# Patient Record
Sex: FEMALE | Race: WHITE | Employment: UNEMPLOYED | URBAN - NONMETROPOLITAN AREA
[De-identification: names, ages, dates, MRNs, and addresses within clinical notes are randomized per-mention and may not be internally consistent; named-entity substitution may affect disease eponyms.]

---
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
## 2025-01-22 ENCOUNTER — HOSPITAL ENCOUNTER (EMERGENCY)
Age: 20
Discharge: HOME | End: 2025-01-22
Payer: COMMERCIAL

## 2025-01-22 VITALS
HEART RATE: 103 BPM | OXYGEN SATURATION: 100 % | SYSTOLIC BLOOD PRESSURE: 122 MMHG | DIASTOLIC BLOOD PRESSURE: 66 MMHG | WEIGHT: 108 LBS | BODY MASS INDEX: 16.92 KG/M2 | TEMPERATURE: 98.2 F | RESPIRATION RATE: 18 BRPM

## 2025-01-22 DIAGNOSIS — Z32.01 POSITIVE URINE PREGNANCY TEST: Primary | ICD-10-CM

## 2025-01-22 DIAGNOSIS — Z34.01 PREGNANCY, FIRST, FIRST TRIMESTER: ICD-10-CM

## 2025-01-22 LAB — HCG UR QL: POSITIVE

## 2025-01-22 PROCEDURE — 99213 OFFICE O/P EST LOW 20 MIN: CPT

## 2025-01-22 PROCEDURE — 81025 URINE PREGNANCY TEST: CPT

## 2025-01-22 RX ORDER — PNV NO.95/FERROUS FUM/FOLIC AC 28MG-0.8MG
1 TABLET ORAL DAILY
Qty: 90 TABLET | Refills: 0 | Status: SHIPPED | OUTPATIENT
Start: 2025-01-22 | End: 2025-04-22

## 2025-01-22 ASSESSMENT — PAIN SCALES - GENERAL: PAINLEVEL_OUTOF10: 3

## 2025-01-22 ASSESSMENT — ENCOUNTER SYMPTOMS
ALLERGIC/IMMUNOLOGIC NEGATIVE: 1
TROUBLE SWALLOWING: 0
DIARRHEA: 0
SORE THROAT: 0
BACK PAIN: 0
CONSTIPATION: 0
EYE DISCHARGE: 0
ABDOMINAL PAIN: 0
NAUSEA: 0
EYE PAIN: 0
RHINORRHEA: 0
COUGH: 0
WHEEZING: 0
SHORTNESS OF BREATH: 0
VOMITING: 0
EYE REDNESS: 0

## 2025-01-22 ASSESSMENT — PAIN - FUNCTIONAL ASSESSMENT
PAIN_FUNCTIONAL_ASSESSMENT: 0-10
PAIN_FUNCTIONAL_ASSESSMENT: ACTIVITIES ARE NOT PREVENTED

## 2025-01-22 ASSESSMENT — PAIN DESCRIPTION - DESCRIPTORS: DESCRIPTORS: ACHING

## 2025-01-22 ASSESSMENT — PAIN DESCRIPTION - LOCATION: LOCATION: ABDOMEN

## 2025-01-22 ASSESSMENT — PAIN DESCRIPTION - PAIN TYPE: TYPE: ACUTE PAIN

## 2025-01-22 ASSESSMENT — PAIN DESCRIPTION - FREQUENCY: FREQUENCY: CONTINUOUS

## 2025-01-22 NOTE — ED PROVIDER NOTES
[facility] URGENT CARE  Urgent Care Encounter      CHIEF COMPLAINT       Chief Complaint   Patient presents with    Pregnancy Test     \"I took 2 home tests and they were both positive so I just wanted to be sure\"       Nurses Notes reviewed and I agree except as noted in the HPI.  HISTORY OF PRESENT ILLNESS   Yen Jewell is a 19 y.o. female who presents with concern about pregnancy as her normal menstrual cycle was 13 days late, and she is having pelvic cramping.  Patient is never been pregnant before.    REVIEW OF SYSTEMS     Review of Systems   Constitutional:  Negative for activity change, fatigue and fever.   HENT:  Negative for congestion, ear pain, rhinorrhea, sore throat and trouble swallowing.    Eyes:  Negative for pain, discharge and redness.   Respiratory:  Negative for cough, shortness of breath and wheezing.    Cardiovascular: Negative.    Gastrointestinal:  Negative for abdominal pain, constipation, diarrhea, nausea and vomiting.        Pelvic cramping   Endocrine: Negative.    Genitourinary:  Negative for dysuria, frequency and urgency.   Musculoskeletal:  Negative for arthralgias, back pain and myalgias.   Skin:  Negative for rash.   Allergic/Immunologic: Negative.    Neurological:  Negative for dizziness, tremors, weakness and headaches.   Hematological: Negative.    Psychiatric/Behavioral:  Negative for dysphoric mood and sleep disturbance. The patient is not nervous/anxious.        PAST MEDICAL HISTORY   History reviewed. No pertinent past medical history.    SURGICAL HISTORY     Patient  has no past surgical history on file.    CURRENT MEDICATIONS       Previous Medications    LEVOTHYROXINE (SYNTHROID) 50 MCG TABLET    Take 1 tablet by mouth Daily       ALLERGIES     Patient is is allergic to amoxil [amoxicillin].    FAMILY HISTORY     Patient'sfamily history is not on file.    SOCIAL HISTORY     Patient  reports that she has quit smoking. Her smoking use included cigarettes. She has never

## 2025-04-30 ENCOUNTER — ROUTINE PRENATAL (OUTPATIENT)
Age: 20
End: 2025-04-30
Payer: MEDICAID

## 2025-04-30 VITALS
DIASTOLIC BLOOD PRESSURE: 62 MMHG | TEMPERATURE: 99.1 F | BODY MASS INDEX: 18.52 KG/M2 | WEIGHT: 118 LBS | HEIGHT: 67 IN | SYSTOLIC BLOOD PRESSURE: 117 MMHG | RESPIRATION RATE: 16 BRPM | HEART RATE: 102 BPM

## 2025-04-30 DIAGNOSIS — Z3A.20 20 WEEKS GESTATION OF PREGNANCY: ICD-10-CM

## 2025-04-30 DIAGNOSIS — E06.3 AUTOIMMUNE THYROIDITIS: ICD-10-CM

## 2025-04-30 DIAGNOSIS — Z34.02 FIRST PREGNANCY, SECOND TRIMESTER: Primary | ICD-10-CM

## 2025-04-30 PROCEDURE — 99202 OFFICE O/P NEW SF 15 MIN: CPT | Performed by: OBSTETRICS & GYNECOLOGY

## 2025-04-30 PROCEDURE — 76811 OB US DETAILED SNGL FETUS: CPT | Performed by: OBSTETRICS & GYNECOLOGY

## 2025-04-30 PROCEDURE — 76817 TRANSVAGINAL US OBSTETRIC: CPT | Performed by: OBSTETRICS & GYNECOLOGY

## 2025-04-30 RX ORDER — LEVOTHYROXINE SODIUM 25 UG/1
25 TABLET ORAL DAILY
Qty: 90 TABLET | Refills: 1 | Status: SHIPPED | OUTPATIENT
Start: 2025-04-30

## 2025-04-30 RX ORDER — QUETIAPINE FUMARATE 400 MG/1
TABLET, FILM COATED ORAL
COMMUNITY
Start: 2025-03-06

## 2025-04-30 NOTE — PROGRESS NOTES
PT did not provide urine at time of vitals  
Please refer to attached ultrasound report for doctor's evaluation of the clinical information obtained by vital signs, ultrasound, and/or non-stress test along with management recommendation.    
intercourse no breast emulation no orgasm.  2.  Return in 6 weeks for growth and placental evaluation.  3.  If you could order a TSH and free T4 for her in 4 weeks that would be greatly appreciated.  4.  Follow-up would be otherwise as clinically indicated.    The patient is to continue to follow with you in your office for ongoing obstetric care.     PLAN:    As noted above or sooner prn.    Sincerely,        Avinash Spivey MD    I spent 30 minutes of direct contact time with the patient of which greater than 50% of the time was used to  the patient, discuss complications and problems related to her pregnancy, or coordinating her care in addition to the time spent interpreting the ultrasound. I answered all of her questions to her satisfaction.

## 2025-06-08 ENCOUNTER — HOSPITAL ENCOUNTER (OUTPATIENT)
Age: 20
Discharge: HOME | End: 2025-06-08
Attending: OBSTETRICS & GYNECOLOGY
Payer: MEDICAID

## 2025-06-08 VITALS — TEMPERATURE: 97.3 F | DIASTOLIC BLOOD PRESSURE: 72 MMHG | HEART RATE: 97 BPM | SYSTOLIC BLOOD PRESSURE: 129 MMHG

## 2025-06-08 DIAGNOSIS — R51.9 HEADACHE IN PREGNANCY, ANTEPARTUM, SECOND TRIMESTER: Primary | ICD-10-CM

## 2025-06-08 DIAGNOSIS — O26.892 HEADACHE IN PREGNANCY, ANTEPARTUM, SECOND TRIMESTER: Primary | ICD-10-CM

## 2025-06-08 PROCEDURE — 6370000000 HC RX 637 (ALT 250 FOR IP): Performed by: OBSTETRICS & GYNECOLOGY

## 2025-06-08 PROCEDURE — 99283 EMERGENCY DEPT VISIT LOW MDM: CPT

## 2025-06-08 RX ORDER — ONDANSETRON 4 MG/1
4 TABLET, ORALLY DISINTEGRATING ORAL ONCE
Status: COMPLETED | OUTPATIENT
Start: 2025-06-08 | End: 2025-06-08

## 2025-06-08 RX ORDER — ACETAMINOPHEN 325 MG/1
650 TABLET ORAL ONCE
Status: COMPLETED | OUTPATIENT
Start: 2025-06-08 | End: 2025-06-08

## 2025-06-08 RX ORDER — IBUPROFEN 800 MG/1
800 TABLET, FILM COATED ORAL
Status: COMPLETED | OUTPATIENT
Start: 2025-06-08 | End: 2025-06-08

## 2025-06-08 RX ORDER — FAMOTIDINE 20 MG/1
20 TABLET, FILM COATED ORAL 2 TIMES DAILY
Status: DISCONTINUED | OUTPATIENT
Start: 2025-06-08 | End: 2025-06-09 | Stop reason: HOSPADM

## 2025-06-08 RX ADMIN — ONDANSETRON 4 MG: 4 TABLET, ORALLY DISINTEGRATING ORAL at 22:56

## 2025-06-08 RX ADMIN — IBUPROFEN 800 MG: 800 TABLET, FILM COATED ORAL at 23:15

## 2025-06-08 RX ADMIN — FAMOTIDINE 20 MG: 20 TABLET, FILM COATED ORAL at 22:53

## 2025-06-08 RX ADMIN — ACETAMINOPHEN 650 MG: 325 TABLET ORAL at 22:53

## 2025-06-09 NOTE — ED TRIAGE NOTES
Pt states she does have suicidal ideation at times, denies having an active plan for suicide or self harm currently.

## 2025-06-09 NOTE — ED TRIAGE NOTES
Pt now states her OB is actually in [place] behind [facility] and that she has not kept her last couple of appts there. She has an upcoming appt with a doctor in [place] but doesn't know their name either.

## 2025-06-09 NOTE — ED TRIAGE NOTES
Discharge instructions given and reviewed with patient.  Informed patient to notify physican or come back to L & D if leaking fluid from vagina with or without contractions.  Bright red vaginal bleeding occurs that is as heavy as or heavier than a period.  Regular contractions that are 2-5 minutes apart that are longer, stronger, and closer together.  Decreased fetal movement.  Elevated temperature >100.5°F and chills.  Blurred vision, spots before eyes, unrelievable headache, severe facial swelling, or upper abdominal pain.  Questions denied. Advised pt to keep her next scheduled appt with primary OBGYN, she is agreeable.

## 2025-06-09 NOTE — ED TRIAGE NOTES
Dr. Love aware pt is here, she is attending a delivery currently and will be in to see pt shortly.

## 2025-06-09 NOTE — ED TRIAGE NOTES
G1 25w4d patient (thinks she sees Dr. Kovacs but says could not remember her name) presents to L&D for c/o headache for 2 days as well as some nausea, reflux, and achy legs. Pt notes positive fetal movement. Denies vaginal bleeding or leaking of fluids. Denies regular contractions. Denies any fever.    Pt states has not tried any Tylenol for headache. Pt's boyfriend says his dad was recently sick with a viral illness that gave him a headache and cough for about a week.

## 2025-06-09 NOTE — DISCHARGE INSTRUCTIONS
Home Undelivered Discharge Instructions    After Discharge Orders:           Diet: regular diet   Increase fluid intake to 8-10 glasses of water daily    Rest: normal activity as tolerated    Other instructions: Do kick counts once a day on your baby. Choose the time of day your baby is most active. Get in a comfortable lying or sitting position and time how long it takes to feel 10 kicks, twists, turns, swishes, or rolls.     Call Your Doctor if Any of the Following Occurs   Leaking fluid from vagina with or without contractions  Bright red vaginal bleeding occurs that is as heavy as or heavier than a period  Regular contractions are longer, stronger, and closer together  Noticeable decreased fetal movement  Elevated temperature >100.5°F and chills  Blurred vision, spots before eyes, unrelievable headache, severe facial swelling, or upper abdominal pain  Contact physician or hospital OB unit if signs of premature labor occur at <=36 weeks  Persistent or rhythmic low back pain that feels different than you are used to  Menstrual like cramps  Intestinal cramps with or without diarrhea  Pelvic pressure or rhythmic tightening that feels different than you are used to  Watery discharge or a gush of fluid from your vagina  Vaginal bleeding as heavy as a period  General Information     Difference between:  False Labor True Labor   1. Contractions often are irregular and don't consistently get closer together (called [place]-Villanueva contractions) 1. Contractions come at regular intervals and, as time goes on, get closer together.   2. Contractions may often stop when you rest or with a position change. 2. Contractions continue despite movement or walking. Contractions get stronger and closer together with time.   3. Often felt in the lower abdomen. 3. Usually felt in back coming around to the front.     Reminder to Patient   Please bring all teaching sheets and discharge information with you if you return to the hospital or

## 2025-06-09 NOTE — ED PROVIDER NOTES
Department of Obstetrics and Gynecology  Labor and Delivery  OB EMERGENCY ROOM    [facility] Attending      Pt Name: Yen Jewell  MRN: [id number] Acct #: [id number]  YOB: 2005  Procedure Performed By: Page Love MD       CC: headache x 2d, nausea, heartburn      SUBJECTIVE  Patient is a  20 year old  at 25 weeks,  patient of a Dr in [facility] (unsure of name), presents with complaints of  headache x 2d, nausea, heartburn.  She says her headache is frontal, bitemporal, no vision changes or n/v.  She has not taken tylenol.  She is drinking lots of water.    Patient denies any decreased fetal movement, vaginal bleeding, leaking of fluid  Denies any headaches, visual symptoms or epigastric pain.   Denies fever, chills, coughing, wheezing, chest pain, shortness of breath, nausea, vomiting, diarrhea, constipation, or dysuria.    OB History    OB History    Para Term  AB Living   1        SAB IAB Ectopic Molar Multiple Live Births              # Outcome Date GA Lbr Geo/2nd Weight Sex Type Anes PTL Lv   1 Current                PMH: History reviewed. No pertinent past medical history.    PSH:   Past Surgical History:   Procedure Laterality Date    NOSE SURGERY      age 7       ROS - negative except those noted in HPI    Allergies: Amoxil [amoxicillin]    Family History: History reviewed. No pertinent family history.    Social History:   Social History     Tobacco Use    Smoking status: Former     Types: Cigarettes    Smokeless tobacco: Never   Vaping Use    Vaping status: Former    Substances: Nicotine    Devices: Disposable, Pre-filled pod   Substance Use Topics    Alcohol use: Not Currently     Comment: rarely    Drug use: Not Currently     Frequency: 7.0 times per week     Types: Marijuana (Weed)     Comment: daily       OBJECTIVE:  Vitals: Patient Vitals for the past 24 hrs:   BP Temp Pulse   25 2227 129/72 97.3 °F (36.3 °C) 97     General appearance: No apparent distress,

## 2025-06-11 ENCOUNTER — ROUTINE PRENATAL (OUTPATIENT)
Age: 20
End: 2025-06-11
Payer: MEDICAID

## 2025-06-11 VITALS
WEIGHT: 125.66 LBS | SYSTOLIC BLOOD PRESSURE: 109 MMHG | RESPIRATION RATE: 16 BRPM | DIASTOLIC BLOOD PRESSURE: 69 MMHG | HEART RATE: 108 BPM | HEIGHT: 67 IN | BODY MASS INDEX: 19.72 KG/M2 | TEMPERATURE: 100.8 F

## 2025-06-11 DIAGNOSIS — Z34.02 FIRST PREGNANCY, SECOND TRIMESTER: ICD-10-CM

## 2025-06-11 DIAGNOSIS — Z3A.26 26 WEEKS GESTATION OF PREGNANCY: ICD-10-CM

## 2025-06-11 DIAGNOSIS — O36.5920 POOR FETAL GROWTH AFFECTING MANAGEMENT OF MOTHER IN SECOND TRIMESTER, SINGLE OR UNSPECIFIED FETUS: Primary | ICD-10-CM

## 2025-06-11 PROCEDURE — 76816 OB US FOLLOW-UP PER FETUS: CPT | Performed by: OBSTETRICS & GYNECOLOGY

## 2025-06-11 PROCEDURE — 76817 TRANSVAGINAL US OBSTETRIC: CPT | Performed by: OBSTETRICS & GYNECOLOGY

## 2025-06-11 NOTE — PROGRESS NOTES
[facility] MATERNAL FETAL MED  [address]  [place] [address]  Dept: [phone]     2025    RE:  Yen Jewell     : 2005   AGE: 20 y.o.     Dear Dr. Palma,    Thank you for allowing me to see Yen Jewell.  As I'm sure you will recall, Yen Jewell is a 20 y.o. [id number]'s last menstrual period was 2024 (exact date). Estimated Date of Delivery: 25 at 26w0d seen in our office today for the following:    REASON FOR VISIT: Growth and placental location    Patient Active Problem List    Diagnosis Date Noted    Poor fetal growth affecting management of mother in second trimester 2025    Headache in pregnancy, antepartum, second trimester 2025    First pregnancy, second trimester 2025    26 weeks gestation of pregnancy 2025    Autoimmune thyroiditis 2025        PAST HISTORY:  OB History    Para Term  AB Living   1        SAB IAB Ectopic Molar Multiple Live Births              # Outcome Date GA Lbr Geo/2nd Weight Sex Type Anes PTL Lv   1 Current                   MEDICAL:  History reviewed. No pertinent past medical history.     SURGICAL:  Past Surgical History:   Procedure Laterality Date    NOSE SURGERY      age 7       ALLERGIES:    Allergies   Allergen Reactions    Amoxil [Amoxicillin] Hives         MEDICATIONS:    Current Outpatient Medications   Medication Sig Dispense Refill    QUEtiapine (SEROQUEL) 400 MG tablet TAKE 1 TABLET BY MOUTH AT BEDTIME FOR 90 DAYS      levothyroxine (SYNTHROID) 25 MCG tablet Take 1 tablet by mouth daily 90 tablet 1    Prenatal Vit-Fe Fumarate-FA (PRENATAL VITAMINS) 28-0.8 MG TABS Take 1 tablet by mouth daily 90 tablet 0     No current facility-administered medications for this visit.        Social History     Socioeconomic History    Marital status: Single     Spouse name: None    Number of children: None    Years of education: None    Highest

## 2025-06-18 ENCOUNTER — HOSPITAL ENCOUNTER (EMERGENCY)
Age: 20
Discharge: HOME | End: 2025-06-18
Payer: MEDICAID

## 2025-06-18 VITALS
HEART RATE: 119 BPM | DIASTOLIC BLOOD PRESSURE: 71 MMHG | RESPIRATION RATE: 16 BRPM | WEIGHT: 125 LBS | SYSTOLIC BLOOD PRESSURE: 127 MMHG | OXYGEN SATURATION: 99 % | BODY MASS INDEX: 19.62 KG/M2 | HEIGHT: 67 IN | TEMPERATURE: 97.8 F

## 2025-06-18 DIAGNOSIS — K64.9 HEMORRHOIDS, UNSPECIFIED HEMORRHOID TYPE: Primary | ICD-10-CM

## 2025-06-18 PROCEDURE — 99213 OFFICE O/P EST LOW 20 MIN: CPT

## 2025-06-18 PROCEDURE — 99213 OFFICE O/P EST LOW 20 MIN: CPT | Performed by: NURSE PRACTITIONER

## 2025-06-18 RX ORDER — LIDOCAINE 50 MG/G
OINTMENT TOPICAL
Qty: 35 G | Refills: 0 | Status: SHIPPED | OUTPATIENT
Start: 2025-06-18

## 2025-06-18 RX ORDER — BENZOCAINE/MENTHOL 6 MG-10 MG
LOZENGE MUCOUS MEMBRANE
Qty: 28 G | Refills: 0 | Status: SHIPPED | OUTPATIENT
Start: 2025-06-18

## 2025-06-18 ASSESSMENT — ENCOUNTER SYMPTOMS
NAUSEA: 0
DIARRHEA: 0
SORE THROAT: 0
SHORTNESS OF BREATH: 0
VOMITING: 0
RECTAL PAIN: 1
ABDOMINAL PAIN: 0
COUGH: 0
CONSTIPATION: 0

## 2025-06-18 ASSESSMENT — PAIN DESCRIPTION - ONSET: ONSET: GRADUAL

## 2025-06-18 ASSESSMENT — PAIN SCALES - GENERAL: PAINLEVEL_OUTOF10: 6

## 2025-06-18 ASSESSMENT — PAIN DESCRIPTION - PAIN TYPE: TYPE: ACUTE PAIN

## 2025-06-18 ASSESSMENT — PAIN DESCRIPTION - FREQUENCY: FREQUENCY: CONTINUOUS

## 2025-06-18 ASSESSMENT — PAIN - FUNCTIONAL ASSESSMENT
PAIN_FUNCTIONAL_ASSESSMENT: PREVENTS OR INTERFERES SOME ACTIVE ACTIVITIES AND ADLS
PAIN_FUNCTIONAL_ASSESSMENT: 0-10

## 2025-06-18 ASSESSMENT — PAIN DESCRIPTION - LOCATION: LOCATION: RECTUM

## 2025-06-18 ASSESSMENT — PAIN DESCRIPTION - DESCRIPTORS: DESCRIPTORS: SORE

## 2025-06-18 NOTE — ED NOTES
Pt co noticing a painful lump near her rectum this am that has gotten more painful throughout the day.      Jah Hayward RN  06/18/25 3931

## 2025-06-18 NOTE — ED PROVIDER NOTES
[facility] URGENT CARE  UrgentCare Encounter      CHIEFCOMPLAINT       Chief Complaint   Patient presents with    Rectal Pain     Lump on rectal area       Nurses Notes reviewed and I agree except as noted in the HPI.  HISTORY OF PRESENT ILLNESS   Yen Jewell is a 20 y.o. female who presents to urgent care for complaints of rectal pain and swelling.  States started a couple of days ago, and has gotten worse.  Voices that she is 27 weeks pregnant.  Denies any constipation or hard BMs.  Also denies fever, chills, body aches, nausea, vomiting, or diarrhea.    REVIEW OF SYSTEMS     Review of Systems   Constitutional:  Negative for chills, fatigue and fever.   HENT:  Negative for congestion and sore throat.    Respiratory:  Negative for cough and shortness of breath.    Cardiovascular:  Negative for chest pain.   Gastrointestinal:  Positive for rectal pain. Negative for abdominal pain, constipation, diarrhea, nausea and vomiting.   Genitourinary:  Negative for dysuria.   Musculoskeletal:  Negative for myalgias.   Skin:  Negative for rash.         PAST MEDICAL HISTORY   History reviewed. No pertinent past medical history.    SURGICAL HISTORY     Patient  has a past surgical history that includes Nose surgery.    CURRENT MEDICATIONS       Discharge Medication List as of 6/18/2025  6:49 PM        CONTINUE these medications which have NOT CHANGED    Details   QUEtiapine (SEROQUEL) 400 MG tablet TAKE 1 TABLET BY MOUTH AT BEDTIME FOR 90 DAYSHistorical Med      levothyroxine (SYNTHROID) 25 MCG tablet Take 1 tablet by mouth daily, Disp-90 tablet, R-1Normal      Prenatal Vit-Fe Fumarate-FA (PRENATAL VITAMINS) 28-0.8 MG TABS Take 1 tablet by mouth daily, Disp-90 tablet, R-0May substitute any other appropriate prenatal vitamin available.Normal             ALLERGIES     Patient is is allergic to amoxil [amoxicillin].    FAMILY HISTORY     Patient'sfamily history is not on file.    SOCIAL HISTORY     Patient  reports that she has

## 2025-06-18 NOTE — ED NOTES
PT GIVEN DISCHARGE INSTRUCTIONS, VERBALIZES UNDERSTANDING.  PT ASSESSMENT UNCHANGED, DISCHARGED IN STABLE CONDITION.        Jah Hayward RN  06/18/25 3506

## 2025-06-18 NOTE — DISCHARGE INSTRUCTIONS
Call OB/GYN for [place] tomorrow morning.    Tylenol for pain.    Try to avoid prolonged sitting on toilet.    Take MiraLAX to keep stools soft.

## 2025-07-02 ENCOUNTER — ROUTINE PRENATAL (OUTPATIENT)
Age: 20
End: 2025-07-02
Payer: MEDICAID

## 2025-07-02 VITALS
WEIGHT: 136.02 LBS | SYSTOLIC BLOOD PRESSURE: 132 MMHG | HEIGHT: 67 IN | BODY MASS INDEX: 21.35 KG/M2 | HEART RATE: 112 BPM | RESPIRATION RATE: 16 BRPM | DIASTOLIC BLOOD PRESSURE: 70 MMHG | TEMPERATURE: 99.9 F

## 2025-07-02 DIAGNOSIS — O26.892 HEADACHE IN PREGNANCY, ANTEPARTUM, SECOND TRIMESTER: ICD-10-CM

## 2025-07-02 DIAGNOSIS — Z34.02 FIRST PREGNANCY, SECOND TRIMESTER: ICD-10-CM

## 2025-07-02 DIAGNOSIS — R51.9 HEADACHE IN PREGNANCY, ANTEPARTUM, SECOND TRIMESTER: ICD-10-CM

## 2025-07-02 DIAGNOSIS — O36.5920 POOR FETAL GROWTH AFFECTING MANAGEMENT OF MOTHER IN SECOND TRIMESTER, SINGLE OR UNSPECIFIED FETUS: Primary | ICD-10-CM

## 2025-07-02 DIAGNOSIS — Z3A.29 29 WEEKS GESTATION OF PREGNANCY: ICD-10-CM

## 2025-07-02 DIAGNOSIS — E06.3 AUTOIMMUNE THYROIDITIS: ICD-10-CM

## 2025-07-02 PROCEDURE — 76820 UMBILICAL ARTERY ECHO: CPT | Performed by: OBSTETRICS & GYNECOLOGY

## 2025-07-02 PROCEDURE — 76819 FETAL BIOPHYS PROFIL W/O NST: CPT | Performed by: OBSTETRICS & GYNECOLOGY

## 2025-07-02 PROCEDURE — 76805 OB US >/= 14 WKS SNGL FETUS: CPT | Performed by: OBSTETRICS & GYNECOLOGY

## 2025-07-02 NOTE — PROGRESS NOTES
[facility] MATERNAL FETAL MED  [address]  [place] [address]  Dept: [phone]     2025    RE:  Yen Jewell     : 2005   AGE: 20 y.o.     Dear Dr. Palma,    Thank you for allowing me to see Yen Jewell.  As I'm sure you will recall, Yen Jewell is a 20 y.o. [id number]'s last menstrual period was 2024 (exact date). Estimated Date of Delivery: 25 at 29w0d seen in our office today for the following:    REASON FOR VISIT: Growth, BPP, umbilical Dopplers    Patient Active Problem List    Diagnosis Date Noted    Poor fetal growth affecting management of mother in second trimester 2025    Headache in pregnancy, antepartum, second trimester 2025    First pregnancy, second trimester 2025    29 weeks gestation of pregnancy 2025    Autoimmune thyroiditis 2025        PAST HISTORY:  OB History    Para Term  AB Living   1        SAB IAB Ectopic Molar Multiple Live Births              # Outcome Date GA Lbr Geo/2nd Weight Sex Type Anes PTL Lv   1 Current                   MEDICAL:  History reviewed. No pertinent past medical history.     SURGICAL:  Past Surgical History:   Procedure Laterality Date    NOSE SURGERY      age 7       ALLERGIES:    Allergies   Allergen Reactions    Amoxil [Amoxicillin] Hives         MEDICATIONS:    Current Outpatient Medications   Medication Sig Dispense Refill    QUEtiapine (SEROQUEL) 400 MG tablet TAKE 1 TABLET BY MOUTH AT BEDTIME FOR 90 DAYS      levothyroxine (SYNTHROID) 25 MCG tablet Take 1 tablet by mouth daily 90 tablet 1    Prenatal Vit-Fe Fumarate-FA (PRENATAL VITAMINS) 28-0.8 MG TABS Take 1 tablet by mouth daily 90 tablet 0    lidocaine (XYLOCAINE) 5 % ointment Apply topically as needed. (Patient not taking: Reported on 2025) 35 g 0    hydrocortisone (ALA-HAROON) 1 % cream Apply topically 2 times daily. (Patient not taking: Reported on

## 2025-07-08 ENCOUNTER — HOSPITAL ENCOUNTER (EMERGENCY)
Age: 20
Discharge: HOME | End: 2025-07-08
Payer: MEDICAID

## 2025-07-08 VITALS
OXYGEN SATURATION: 98 % | HEART RATE: 118 BPM | WEIGHT: 136 LBS | HEIGHT: 67 IN | TEMPERATURE: 98.8 F | BODY MASS INDEX: 21.35 KG/M2 | SYSTOLIC BLOOD PRESSURE: 130 MMHG | RESPIRATION RATE: 16 BRPM | DIASTOLIC BLOOD PRESSURE: 69 MMHG

## 2025-07-08 DIAGNOSIS — Z34.93 NORMAL PREGNANCY IN THIRD TRIMESTER: Primary | ICD-10-CM

## 2025-07-08 PROCEDURE — 99282 EMERGENCY DEPT VISIT SF MDM: CPT

## 2025-07-08 NOTE — DISCHARGE INSTRUCTIONS
Home Undelivered Discharge Instructions    After Discharge Orders:           Diet: regular diet   Increase fluid intake to 8-10 glasses of water daily    Rest: normal activity as tolerated    Other instructions: Do kick counts once a day on your baby. Choose the time of day your baby is most active. Get in a comfortable lying or sitting position and time how long it takes to feel 10 kicks, twists, turns, swishes, or rolls.     Call Your Doctor if Any of the Following Occurs   Leaking fluid from vagina with or without contractions  Bright red vaginal bleeding occurs that is as heavy as or heavier than a period  Regular contractions are longer, stronger, and closer together  Noticeable decreased fetal movement  Elevated temperature >100.5°F and chills  Blurred vision, spots before eyes, unrelievable headache, severe facial swelling, or upper abdominal pain  Contact physician or hospital OB unit if signs of premature labor occur at <=36 weeks  Persistent or rhythmic low back pain that feels different than you are used to  Menstrual like cramps  Intestinal cramps with or without diarrhea  Pelvic pressure or rhythmic tightening that feels different than you are used to  Watery discharge or a gush of fluid from your vagina  Vaginal bleeding as heavy as a period  General Information     Difference between:  False Labor True Labor   1. Contractions often are irregular and don't consistently get closer together (called [place]-Villanueva contractions) 1. Contractions come at regular intervals and, as time goes on, get closer together.   2. Contractions may often stop when you rest or with a position change. 2. Contractions continue despite movement or walking. Contractions get stronger and closer together with time.   3. Often felt in the lower abdomen. 3. Usually felt in back coming around to the front.     Reminder to Patient   Please bring all teaching sheets and discharge information with you if you return to the hospital or

## 2025-07-08 NOTE — ED TRIAGE NOTES
Pt is followed by a physician at [facility]. Pt arrives today with concerns that her \"belly dropped\" from last night until today. Pt feels silly for coming but after her mom and sister raised concerns about the look of her belly, her and her sig other became concerned and she thought she should come since she's high risk. Pt denies any vag bleeding, contractions, or leaking of fluid. Reports +fm.

## 2025-07-08 NOTE — FLOWSHEET NOTE
Pt ready for discharge home. Denies any other needs or concerns at this time, pt to follow up at her normal scheduled appt tomorrow. Wheelchair ride offered to exit but pt states she would like to walk.     Pt and sig other ambulated to exit in stable condition.

## 2025-07-08 NOTE — ED PROVIDER NOTES
CC: baby dropped        SUBJECTIVE  Patient is a  20 year old  at 29.6 wk, complaining of being told to come in to be evaluated for \"baby dropping\".  States her mother and sister were facetiming her and eyeball evaluation of her abdomen they determined her baby had dropped and that she was probably going to go into labor soon    Patient recently cleared from pelvic rest after she had 28 wk US that indicated her low lying placenta has resolved.  Patient denies vaginal bleeding, loss of fluid, cramping or contractions.   + normal fetal movement.     Denies HA/SOB/CP/vision changes/RUQ pain.     patient of a Dr in [place] (unsure of name)   OB History                      OB History    Para Term  AB Living    1              SAB IAB Ectopic Molar Multiple Live Births                       # Outcome Date GA Lbr Geo/2nd Weight Sex Type Anes PTL Lv   1 Current                           PMH:   Past Medical History   History reviewed. No pertinent past medical history.        PSH:   Past Surgical History         Past Surgical History:   Procedure Laterality Date    NOSE SURGERY         age 7            ROS - negative except those noted in HPI     Allergies: Amoxil [amoxicillin]     Family History:   Family History   History reviewed. No pertinent family history.        Social History:   Social History   Social History            Tobacco Use    Smoking status: Former       Types: Cigarettes    Smokeless tobacco: Never   Vaping Use    Vaping status: Former    Substances: Nicotine    Devices: Disposable, Pre-filled pod   Substance Use Topics    Alcohol use: Not Currently       Comment: rarely    Drug use: Not Currently       Frequency: 7.0 times per week       Types: Marijuana (Weed)       Comment: daily            OBJECTIVE:  Vitals: Patient Vitals for the past 24 hrs:    BP Temp Pulse   25 2227 129/72 97.3 °F (36.3 °C) 97      General appearance: No apparent distress, appears stated age.  Eyes:

## 2025-07-23 ENCOUNTER — ROUTINE PRENATAL (OUTPATIENT)
Age: 20
End: 2025-07-23
Payer: MEDICAID

## 2025-07-23 VITALS
RESPIRATION RATE: 16 BRPM | DIASTOLIC BLOOD PRESSURE: 77 MMHG | SYSTOLIC BLOOD PRESSURE: 128 MMHG | BODY MASS INDEX: 21.73 KG/M2 | HEIGHT: 67 IN | WEIGHT: 138.45 LBS | HEART RATE: 114 BPM | TEMPERATURE: 99.3 F

## 2025-07-23 DIAGNOSIS — Z34.03 FIRST PREGNANCY, THIRD TRIMESTER: ICD-10-CM

## 2025-07-23 DIAGNOSIS — O36.5920 POOR FETAL GROWTH AFFECTING MANAGEMENT OF MOTHER IN SECOND TRIMESTER, SINGLE OR UNSPECIFIED FETUS: Primary | ICD-10-CM

## 2025-07-23 DIAGNOSIS — Z3A.32 32 WEEKS GESTATION OF PREGNANCY: ICD-10-CM

## 2025-07-23 PROCEDURE — 76820 UMBILICAL ARTERY ECHO: CPT | Performed by: OBSTETRICS & GYNECOLOGY

## 2025-07-23 PROCEDURE — 76816 OB US FOLLOW-UP PER FETUS: CPT | Performed by: OBSTETRICS & GYNECOLOGY

## 2025-07-23 PROCEDURE — 76819 FETAL BIOPHYS PROFIL W/O NST: CPT | Performed by: OBSTETRICS & GYNECOLOGY

## 2025-07-23 NOTE — PROGRESS NOTES
[facility] MATERNAL FETAL MED  [address]  [place] [address]  Dept: [phone]     2025    RE:  Yen Jewell     : 2005   AGE: 20 y.o.     Dear Dr. Palma,    Thank you for allowing me to see Yen Jewell.  As I'm sure you will recall, Yen Jewell is a 20 y.o. [id number]'s last menstrual period was 2024 (exact date). Estimated Date of Delivery: 25 at 32w0d seen in our office today for the following:    REASON FOR VISIT: Growth, BPP, umbilical Dopplers    Patient Active Problem List    Diagnosis Date Noted    Poor fetal growth affecting management of mother in second trimester 2025    Headache in pregnancy, antepartum, second trimester 2025    First pregnancy, second trimester 2025    32 weeks gestation of pregnancy 2025    Autoimmune thyroiditis 2025        PAST HISTORY:  OB History    Para Term  AB Living   1 0 0 0 0 0   SAB IAB Ectopic Molar Multiple Live Births   0 0 0 0 0 0      # Outcome Date GA Lbr Geo/2nd Weight Sex Type Anes PTL Lv   1 Current                   MEDICAL:  Past Medical History:   Diagnosis Date    Bipolar 1 disorder (HCC)     pt taking seroquel    Thyroid disease     taking synthroid        SURGICAL:  Past Surgical History:   Procedure Laterality Date    NOSE SURGERY      age 7       ALLERGIES:    Allergies   Allergen Reactions    Amoxil [Amoxicillin] Hives         MEDICATIONS:    Current Outpatient Medications   Medication Sig Dispense Refill    QUEtiapine (SEROQUEL) 400 MG tablet TAKE 1 TABLET BY MOUTH AT BEDTIME FOR 90 DAYS      levothyroxine (SYNTHROID) 25 MCG tablet Take 1 tablet by mouth daily 90 tablet 1    Prenatal Vit-Fe Fumarate-FA (PRENATAL VITAMINS) 28-0.8 MG TABS Take 1 tablet by mouth daily 90 tablet 0     No current facility-administered medications for this visit.        Social History     Socioeconomic History    Marital status:

## 2025-07-24 ENCOUNTER — HOSPITAL ENCOUNTER (EMERGENCY)
Age: 20
Discharge: HOME | End: 2025-07-24
Payer: MEDICAID

## 2025-07-24 VITALS
TEMPERATURE: 99.3 F | SYSTOLIC BLOOD PRESSURE: 120 MMHG | DIASTOLIC BLOOD PRESSURE: 72 MMHG | HEART RATE: 101 BPM | RESPIRATION RATE: 16 BRPM | OXYGEN SATURATION: 98 %

## 2025-07-24 DIAGNOSIS — L23.7 ALLERGIC CONTACT DERMATITIS DUE TO PLANT: Primary | ICD-10-CM

## 2025-07-24 PROCEDURE — 99213 OFFICE O/P EST LOW 20 MIN: CPT | Performed by: EMERGENCY MEDICINE

## 2025-07-24 PROCEDURE — 99213 OFFICE O/P EST LOW 20 MIN: CPT

## 2025-07-24 RX ORDER — TRIAMCINOLONE ACETONIDE 1 MG/G
CREAM TOPICAL
Qty: 45 G | Refills: 0 | Status: SHIPPED | OUTPATIENT
Start: 2025-07-24

## 2025-07-24 ASSESSMENT — ENCOUNTER SYMPTOMS
SHORTNESS OF BREATH: 0
COUGH: 0
WHEEZING: 0

## 2025-07-24 ASSESSMENT — PAIN - FUNCTIONAL ASSESSMENT: PAIN_FUNCTIONAL_ASSESSMENT: NONE - DENIES PAIN

## 2025-07-24 NOTE — DISCHARGE INSTRUCTIONS
Triamcinolone cream twice daily as directed    Benadryl 1 to 2 tablets every 8 hours as needed for itch    Follow-up with family physician as needed.  Follow-up with obstetrician as planned    Return for new or worsening symptoms

## 2025-07-24 NOTE — ED PROVIDER NOTES
[facility] URGENT CARE  Urgent Care Encounter       CHIEF COMPLAINT       Chief Complaint   Patient presents with    Rash       Nurses Notes reviewed and I agree except as noted in the HPI.  HISTORY OF PRESENT ILLNESS   Yen Jeewll is a 20 y.o. female who presents for complaints of rash to bilateral arms and a small area on her abdomen.  Patient states she was pulling weeds yesterday and the rash began last evening.  States it became very itchy today.  She is currently 32 weeks pregnant.    HPI    REVIEW OF SYSTEMS     Review of Systems   Constitutional:  Negative for activity change and fatigue.   Respiratory:  Negative for cough, shortness of breath and wheezing.    Skin:  Positive for rash.       PAST MEDICAL HISTORY         Diagnosis Date    Bipolar 1 disorder (HCC)     pt taking seroquel    Thyroid disease     taking synthroid       SURGICALHISTORY     Patient  has a past surgical history that includes Nose surgery.    CURRENT MEDICATIONS       Previous Medications    LEVOTHYROXINE (SYNTHROID) 25 MCG TABLET    Take 1 tablet by mouth daily    PRENATAL VIT-FE FUMARATE-FA (PRENATAL VITAMINS) 28-0.8 MG TABS    Take 1 tablet by mouth daily    QUETIAPINE (SEROQUEL) 400 MG TABLET    TAKE 1 TABLET BY MOUTH AT BEDTIME FOR 90 DAYS       ALLERGIES     Patient is is allergic to amoxil [amoxicillin].    Patients   There is no immunization history on file for this patient.    FAMILY HISTORY     Patient's family history is not on file.    SOCIAL HISTORY     Patient  reports that she has quit smoking. Her smoking use included cigarettes. She has never used smokeless tobacco. She reports that she does not currently use alcohol. She reports current drug use. Frequency: 7.00 times per week. Drug: Marijuana (Weed).    PHYSICAL EXAM     ED TRIAGE VITALS  BP: 120/72, Temp: 99.3 °F (37.4 °C), Pulse: (!) 101, Respirations: 16, SpO2: 98 %,Estimated body mass index is 21.68 kg/m² as calculated from the following:    Height as of

## 2025-07-24 NOTE — ED TRIAGE NOTES
Woke around 0600 this morning with itching hands, rash on arms and some spots on hands and abdomen

## 2025-07-25 ENCOUNTER — HOSPITAL ENCOUNTER (EMERGENCY)
Age: 20
Discharge: HOME | End: 2025-07-25
Payer: MEDICAID

## 2025-07-25 VITALS
DIASTOLIC BLOOD PRESSURE: 78 MMHG | TEMPERATURE: 98.8 F | OXYGEN SATURATION: 99 % | RESPIRATION RATE: 18 BRPM | SYSTOLIC BLOOD PRESSURE: 133 MMHG | HEART RATE: 120 BPM

## 2025-07-25 DIAGNOSIS — L23.7 POISON IVY DERMATITIS: Primary | ICD-10-CM

## 2025-07-25 PROCEDURE — 99284 EMERGENCY DEPT VISIT MOD MDM: CPT

## 2025-07-25 PROCEDURE — 6370000000 HC RX 637 (ALT 250 FOR IP): Performed by: STUDENT IN AN ORGANIZED HEALTH CARE EDUCATION/TRAINING PROGRAM

## 2025-07-25 RX ORDER — DIPHENHYDRAMINE HCL 25 MG
25 TABLET ORAL ONCE
Status: COMPLETED | OUTPATIENT
Start: 2025-07-25 | End: 2025-07-25

## 2025-07-25 RX ORDER — PREDNISONE 20 MG/1
TABLET ORAL
Qty: 33 TABLET | Refills: 0 | Status: SHIPPED | OUTPATIENT
Start: 2025-07-25 | End: 2025-08-14

## 2025-07-25 RX ORDER — FERROUS SULFATE 325(65) MG
325 TABLET ORAL
COMMUNITY

## 2025-07-25 RX ORDER — ACETAMINOPHEN 325 MG/1
650 TABLET ORAL EVERY 4 HOURS PRN
Status: DISCONTINUED | OUTPATIENT
Start: 2025-07-25 | End: 2025-07-26 | Stop reason: HOSPADM

## 2025-07-25 RX ADMIN — DIPHENHYDRAMINE HYDROCHLORIDE 25 MG: 25 TABLET ORAL at 22:27

## 2025-07-25 RX ADMIN — ACETAMINOPHEN 650 MG: 325 TABLET ORAL at 22:27

## 2025-07-25 RX ADMIN — PREDNISONE 60 MG: 50 TABLET ORAL at 22:45

## 2025-07-25 ASSESSMENT — PAIN DESCRIPTION - DESCRIPTORS: DESCRIPTORS: BURNING

## 2025-07-25 ASSESSMENT — PAIN - FUNCTIONAL ASSESSMENT: PAIN_FUNCTIONAL_ASSESSMENT: ACTIVITIES ARE NOT PREVENTED

## 2025-07-25 ASSESSMENT — PAIN SCALES - GENERAL: PAINLEVEL_OUTOF10: 5

## 2025-07-25 ASSESSMENT — PAIN DESCRIPTION - LOCATION: LOCATION: ARM

## 2025-07-25 ASSESSMENT — PAIN DESCRIPTION - ORIENTATION: ORIENTATION: LEFT;RIGHT

## 2025-07-26 NOTE — DISCHARGE INSTRUCTIONS
Home Undelivered Discharge Instructions    After Discharge Orders:           Diet: regular diet   Increase fluid intake to 8-10 glasses of water daily    Rest: normal activity as tolerated    Other instructions: Do kick counts once a day on your baby. Choose the time of day your baby is most active. Get in a comfortable lying or sitting position and time how long it takes to feel 10 kicks, twists, turns, swishes, or rolls.     Call Your Doctor if Any of the Following Occurs   Leaking fluid from vagina with or without contractions  Bright red vaginal bleeding occurs that is as heavy as or heavier than a period  Regular contractions are longer, stronger, and closer together  Noticeable decreased fetal movement  Elevated temperature >100.5°F and chills  Blurred vision, spots before eyes, unrelievable headache, severe facial swelling, or upper abdominal pain  Contact physician or hospital OB unit if signs of premature labor occur at <=36 weeks  Persistent or rhythmic low back pain that feels different than you are used to  Menstrual like cramps  Intestinal cramps with or without diarrhea  Pelvic pressure or rhythmic tightening that feels different than you are used to  Watery discharge or a gush of fluid from your vagina  Vaginal bleeding as heavy as a period  General Information     Difference between:  False Labor True Labor   1. Contractions often are irregular and don't consistently get closer together (called [place] contractions) 1. Contractions come at regular intervals and, as time goes on, get closer together.   2. Contractions may often stop when you rest or with a position change. 2. Contractions continue despite movement or walking. Contractions get stronger and closer together with time.   3. Often felt in the lower abdomen. 3. Usually felt in back coming around to the front.     Reminder to Patient   Please bring all teaching sheets and discharge information with you if you return to the hospital or

## 2025-07-26 NOTE — ED TRIAGE NOTES
Patient agreeable to being discharged, therefore, discharge instructions provided to patient. RN educated to continue normal activity as tolerated. Continue regular diet. Drink at least 8-10 glasses of water per day. Continue kick counts. If patient would experience less than 10 kicks/twists/turns, bright red vaginal bleeding similar to a period, leaking of fluid, or contractions that get stronger/closer/stronger together then to return to unit for evaluation. Also, discussed that if she would experience blurred vision, headache, epigastric pain, excessive swelling to also return for evaluation. Discussed that if rash does not improve to follow-up with family doctor. RN also discussed the medication dosage, frequency, and duration. Notified that medication was sent to pharmacy of pt.'s choice. Patient verbalized good understanding and denies questions at this time.

## 2025-07-26 NOTE — ED TRIAGE NOTES
Pt arrived to unit  32+2 with complaints of a rash that began yesterday on her hands. She went to urgent care where they prescribed a cream, but it has progressively worsened today. Rash is red, itchy, and burning and has spread up bilateral upper extremities, abdomen, chest, and face. Notes positive fetal movement. Denies vaginal bleeding, leaking of fluid, and contractions. Pt to restroom to void and change into gown.

## 2025-07-26 NOTE — ED PROVIDER NOTES
Department of Obstetrics and Gynecology  Labor and Delivery  OB EMERGENCY ROOM    [facility] Attending      Pt Name: Yen Jewell  MRN: [id number] Acct #: [id number]  YOB: 2005  Procedure Performed By: Maryan Kovacs DO       CC: Rash       SUBJECTIVE  Patient is a  20 year old  at 32w2d weeks, sees a doctor at [facility], presents with complaints of rash. Pt says a couple days ago she was doing some yard work/landscaping and yesterday she started to notice a rash. Went to urgent care and they thought she was having an eczema flare and prescribed a topical cream. Today the rash has spread. It is up both arms, on her abdomen and chest. She says it is very itchy and burns.   Patient denies any decreased fetal movement, vaginal bleeding, leaking of fluid  Denies any headaches, visual symptoms or epigastric pain.   Denies fever, chills, coughing, wheezing, chest pain, shortness of breath, nausea, vomiting, diarrhea, constipation, or dysuria.    OB History    OB History    Para Term  AB Living   1 0 0 0 0 0   SAB IAB Ectopic Molar Multiple Live Births   0 0 0 0 0 0      # Outcome Date GA Lbr Geo/2nd Weight Sex Type Anes PTL Lv   1 Current                PMH:   Past Medical History:   Diagnosis Date    Anxiety     Bipolar 1 disorder (HCC)     pt taking seroquel    depression     Thyroid disease     taking synthroid       PSH:   Past Surgical History:   Procedure Laterality Date    NOSE SURGERY      age 7       ROS - negative except those noted in HPI    Allergies: Amoxil [amoxicillin]    Family History:       Problem Relation Age of Onset    No Known Problems Mother     No Known Problems Father     Hypothyroidism Sister     No Known Problems Brother        Social History:   Social History     Tobacco Use    Smoking status: Former     Types: Cigarettes    Smokeless tobacco: Never   Vaping Use    Vaping status: Former    Substances: Nicotine    Devices: Disposable, Pre-filled pod

## 2025-07-26 NOTE — ED TRIAGE NOTES
Dr. Kovacs messaged notifying of new patient arrived to unit, 32+2. Pt has established care at [facility]. Pt has red, itchy, burning rash on bilateral upper extremities, face, belly, and chest. Pt states she was cutting brush a few days ago with harriet cuenca. MD acknowledged message.

## 2025-08-14 ENCOUNTER — ROUTINE PRENATAL (OUTPATIENT)
Age: 20
End: 2025-08-14
Payer: MEDICAID

## 2025-08-14 VITALS
BODY MASS INDEX: 22.29 KG/M2 | WEIGHT: 142 LBS | DIASTOLIC BLOOD PRESSURE: 60 MMHG | TEMPERATURE: 99 F | HEIGHT: 67 IN | SYSTOLIC BLOOD PRESSURE: 124 MMHG | HEART RATE: 116 BPM | RESPIRATION RATE: 16 BRPM

## 2025-08-14 DIAGNOSIS — Z34.02 FIRST PREGNANCY, SECOND TRIMESTER: ICD-10-CM

## 2025-08-14 DIAGNOSIS — E06.3 AUTOIMMUNE THYROIDITIS: ICD-10-CM

## 2025-08-14 DIAGNOSIS — Z3A.35 35 WEEKS GESTATION OF PREGNANCY: Primary | ICD-10-CM

## 2025-08-14 PROBLEM — O36.5920 POOR FETAL GROWTH AFFECTING MANAGEMENT OF MOTHER IN SECOND TRIMESTER: Status: RESOLVED | Noted: 2025-06-11 | Resolved: 2025-08-14

## 2025-08-14 PROCEDURE — 76819 FETAL BIOPHYS PROFIL W/O NST: CPT | Performed by: OBSTETRICS & GYNECOLOGY

## 2025-08-14 PROCEDURE — 76816 OB US FOLLOW-UP PER FETUS: CPT | Performed by: OBSTETRICS & GYNECOLOGY

## 2025-08-14 ASSESSMENT — PAIN SCALES - GENERAL: PAINLEVEL_OUTOF10: 0

## 2025-09-06 ENCOUNTER — HOSPITAL ENCOUNTER (EMERGENCY)
Age: 20
Discharge: HOME | End: 2025-09-06
Attending: OBSTETRICS & GYNECOLOGY
Payer: MEDICAID

## 2025-09-06 VITALS
OXYGEN SATURATION: 100 % | RESPIRATION RATE: 14 BRPM | WEIGHT: 140 LBS | BODY MASS INDEX: 21.97 KG/M2 | HEART RATE: 102 BPM | TEMPERATURE: 98.1 F | DIASTOLIC BLOOD PRESSURE: 79 MMHG | SYSTOLIC BLOOD PRESSURE: 127 MMHG | HEIGHT: 67 IN

## 2025-09-06 DIAGNOSIS — O36.8130 DECREASED FETAL MOVEMENT AFFECTING MANAGEMENT OF PREGNANCY IN THIRD TRIMESTER, SINGLE OR UNSPECIFIED FETUS: Primary | ICD-10-CM

## 2025-09-06 PROBLEM — R51.9 HEADACHE IN PREGNANCY, ANTEPARTUM, SECOND TRIMESTER: Status: RESOLVED | Noted: 2025-06-08 | Resolved: 2025-09-06

## 2025-09-06 PROBLEM — Z3A.35 35 WEEKS GESTATION OF PREGNANCY: Status: RESOLVED | Noted: 2025-04-30 | Resolved: 2025-09-06

## 2025-09-06 PROBLEM — O26.892 HEADACHE IN PREGNANCY, ANTEPARTUM, SECOND TRIMESTER: Status: RESOLVED | Noted: 2025-06-08 | Resolved: 2025-09-06

## 2025-09-06 PROCEDURE — 99281 EMR DPT VST MAYX REQ PHY/QHP: CPT

## 2025-09-06 ASSESSMENT — PAIN - FUNCTIONAL ASSESSMENT: PAIN_FUNCTIONAL_ASSESSMENT: 0-10

## 2025-09-06 ASSESSMENT — PAIN SCALES - GENERAL: PAINLEVEL_OUTOF10: 0
